# Patient Record
Sex: MALE | Race: WHITE | NOT HISPANIC OR LATINO | URBAN - METROPOLITAN AREA
[De-identification: names, ages, dates, MRNs, and addresses within clinical notes are randomized per-mention and may not be internally consistent; named-entity substitution may affect disease eponyms.]

---

## 2023-01-01 ENCOUNTER — HOSPITAL ENCOUNTER (EMERGENCY)
Facility: HOSPITAL | Age: 53
End: 2023-08-14
Attending: EMERGENCY MEDICINE

## 2023-01-01 DIAGNOSIS — I46.9 CARDIAC ARREST: Primary | ICD-10-CM

## 2023-01-01 PROCEDURE — 63600175 PHARM REV CODE 636 W HCPCS

## 2023-01-01 PROCEDURE — 92950 HEART/LUNG RESUSCITATION CPR: CPT

## 2023-01-01 PROCEDURE — 25000003 PHARM REV CODE 250

## 2023-01-01 PROCEDURE — 99291 CRITICAL CARE FIRST HOUR: CPT

## 2023-01-01 PROCEDURE — 31500 INSERT EMERGENCY AIRWAY: CPT

## 2023-01-01 PROCEDURE — 82962 GLUCOSE BLOOD TEST: CPT

## 2023-01-01 PROCEDURE — 63600175 PHARM REV CODE 636 W HCPCS: Performed by: NURSE PRACTITIONER

## 2023-01-01 PROCEDURE — 25000003 PHARM REV CODE 250: Performed by: NURSE PRACTITIONER

## 2023-01-01 RX ORDER — CALCIUM CHLORIDE INJECTION 100 MG/ML
INJECTION, SOLUTION INTRAVENOUS CODE/TRAUMA/SEDATION MEDICATION
Status: COMPLETED | OUTPATIENT
Start: 2023-01-01 | End: 2023-01-01

## 2023-01-01 RX ORDER — SODIUM BICARBONATE 1 MEQ/ML
SYRINGE (ML) INTRAVENOUS CODE/TRAUMA/SEDATION MEDICATION
Status: COMPLETED | OUTPATIENT
Start: 2023-01-01 | End: 2023-01-01

## 2023-01-01 RX ORDER — EPINEPHRINE 0.1 MG/ML
INJECTION INTRAVENOUS CODE/TRAUMA/SEDATION MEDICATION
Status: COMPLETED | OUTPATIENT
Start: 2023-01-01 | End: 2023-01-01

## 2023-01-01 RX ORDER — MIDAZOLAM HYDROCHLORIDE 1 MG/ML
INJECTION INTRAMUSCULAR; INTRAVENOUS
Status: DISCONTINUED
Start: 2023-01-01 | End: 2023-08-15 | Stop reason: WASHOUT

## 2023-01-01 RX ORDER — NALOXONE HCL 0.4 MG/ML
VIAL (ML) INJECTION CODE/TRAUMA/SEDATION MEDICATION
Status: COMPLETED | OUTPATIENT
Start: 2023-01-01 | End: 2023-01-01

## 2023-01-01 RX ORDER — ROCURONIUM BROMIDE 10 MG/ML
INJECTION, SOLUTION INTRAVENOUS
Status: DISCONTINUED
Start: 2023-01-01 | End: 2023-08-15 | Stop reason: WASHOUT

## 2023-01-01 RX ADMIN — SODIUM BICARBONATE 50 MEQ: 84 INJECTION, SOLUTION INTRAVENOUS at 09:08

## 2023-01-01 RX ADMIN — NALOXONE HYDROCHLORIDE 0.2 MG: 0.4 INJECTION, SOLUTION INTRAMUSCULAR; INTRAVENOUS; SUBCUTANEOUS at 09:08

## 2023-01-01 RX ADMIN — EPINEPHRINE 0.1 MG: 0.1 INJECTION INTRACARDIAC; INTRAVENOUS at 09:08

## 2023-01-01 RX ADMIN — CALCIUM CHLORIDE 1 G: 100 INJECTION, SOLUTION INTRAVENOUS; INTRAVENTRICULAR at 09:08

## 2023-08-15 NOTE — ED PROVIDER NOTES
Encounter Date: 8/14/2023       History     Chief Complaint   Patient presents with    Cardiac Arrest     Arrived with PD and fire from the [facility]      53-year-old male brought from [facility] as cardiac arrest.  Apparently was arrested with a large amount of meth and marijuana on him and became unresponsive once he was starting to get processed into [facility].  He arrives with no obvious traumatic injuries with CPR in progress.    The history is provided by the EMS personnel. The history is limited by the condition of the patient. No  was used.     Review of patient's allergies indicates:  Not on File  No past medical history on file.  No past surgical history on file.  No family history on file.     Review of Systems   Reason unable to perform ROS: Patient unconscious.       Physical Exam     Initial Vitals   BP Pulse Resp Temp SpO2   -- -- -- -- --      MAP       --         Physical Exam    Nursing note and vitals reviewed.  Constitutional: He appears well-developed and well-nourished.   HENT:   Head: Normocephalic and atraumatic.   Eyes:   Pupils fixed at 3 mm and nonreactive   Neck: Neck supple.   Cardiovascular:            Pulseless   Pulmonary/Chest:   No respiratory effort on his own, lung sounds are clear with bagging   Abdominal: Abdomen is soft.   Musculoskeletal:         General: No edema.      Cervical back: Neck supple.     Neurological:   GCS 3   Skin: Skin is warm and dry.   Skin is warm, there is no lividity         ED Course   Intubation    Date/Time: 8/14/2023 10:05 PM  Location procedure was performed: [facility] EMERGENCY DEPARTMENT    Performed by: Marimar Hilton MD  Authorized by: Marimar Hilton MD  Indications: respiratory failure  Intubation method: direct  Patient status: unconscious  Paralytic: none  Laryngoscope size: Mac 4  Tube size: 8.0 mm  Tube type: cuffed  Number of attempts: 1  Cricoid pressure: no  Cords visualized: yes  Post-procedure assessment: chest rise and CO2  detector  Breath sounds: clear  Cuff inflated: yes  ETT to lip: 24 cm  Tube secured with: adhesive tape      Critical Care    Date/Time: 8/14/2023 10:06 PM    Performed by: Marimar Hilton MD  Authorized by: Marimar Hilton MD  Direct patient critical care time: 20 minutes  Additional history critical care time: 10 minutes  Documentation critical care time: 10 minutes  Total critical care time (exclusive of procedural time) : 40 minutes  Critical care time was exclusive of separately billable procedures and treating other patients and teaching time.  Critical care was necessary to treat or prevent imminent or life-threatening deterioration of the following conditions: cardiac failure and circulatory failure.  Critical care was time spent personally by me on the following activities: interpretation of cardiac output measurements, evaluation of patient's response to treatment, examination of patient, obtaining history from patient or surrogate, ordering and performing treatments and interventions, pulse oximetry and re-evaluation of patient's condition.        Labs Reviewed - No data to display       Imaging Results    None          Medications   EPINEPHrine 0.1 mg/mL injection (0.1 mg Intravenous Given 8/14/23 2158)   sodium bicarbonate 8.4 % (1 mEq/mL) injection (50 mEq Intravenous Given 8/14/23 2154)   naloxone 0.4 mg/mL injection (0.2 mg Intravenous Given 8/14/23 2156)   calcium chloride 100 mg/mL (10 %) injection (1 g Intravenous Given 8/14/23 2156)     Medical Decision Making:   Differential Diagnosis:   Cardiac arrest, drug overdose  ED Management:  Patient arrived with CPR in progress come but nurse practitioner from the intermediate accompanied the patient stated that he had been down for approximately 35 minutes but he was not sure of the total time down.  He had been given 2 doses of 4 mg of Narcan intranasally with no change, the AED had shocked him twice apparently but they had no ability to interpret the  rhythm.  He otherwise had no IV access.  When he arrived both and IV in an IO worse successfully achieved he was given 2 more mg of Narcan with no change.  Chest compressions were continued epinephrine was given.  I intubated the patient in 1 attempt without medications needed there was no gag reflex.  Three more rounds of CPR for a total downtime of approximately 1 hour was done patient remained in asystole after Narcan, calcium, bicarb, epinephrine.  Blood sugar was 160.  Time of death called at 10:01 p.m..                          Clinical Impression:   Final diagnoses:  [I46.9] Cardiac arrest (Primary)        ED Disposition Condition                     Marimar Hilton MD  08/15/23 0054

## 2023-08-15 NOTE — ED NOTES
Pt arrived via  transport, with fire dept CPR in progress, pt being bagged. Pt to ER rm 5 at 2148, pt unresponsive, in full arrest. IO in place to the left promixal tib, pt asystole on monitor, no pulse, or respirations noted.. Per report pt was arrested and brought to prison. After approx 20 of being at the prison pt began having ALOC then became unresponsive, where staff began CPR unknown at what time exactly. Per FD, pt received narcan Intranasal x2 doses, and was shocked with an AED x2 while enroute to hospital.

## 2023-08-15 NOTE — ED NOTES
Per PD, Pt was taken into custody and brought to the custodial. At some point while getting booked in the Pt became unresponsive. CPR was initiated in the custodial prior to arriving.

## 2023-08-17 LAB — POCT GLUCOSE: 160 MG/DL (ref 70–110)
